# Patient Record
Sex: FEMALE | Race: WHITE
[De-identification: names, ages, dates, MRNs, and addresses within clinical notes are randomized per-mention and may not be internally consistent; named-entity substitution may affect disease eponyms.]

---

## 2018-10-29 ENCOUNTER — HOSPITAL ENCOUNTER (OUTPATIENT)
Dept: HOSPITAL 47 - RADUSWWP | Age: 28
Discharge: HOME | End: 2018-10-29
Attending: OBSTETRICS & GYNECOLOGY
Payer: MEDICAID

## 2018-10-29 DIAGNOSIS — Z30.431: Primary | ICD-10-CM

## 2018-10-29 PROCEDURE — 76830 TRANSVAGINAL US NON-OB: CPT

## 2018-10-29 NOTE — US
EXAMINATION TYPE: US transvaginal

 

DATE OF EXAM: 10/29/2018

 

COMPARISON:

 

CLINICAL HISTORY: CHECK IUD PLACEMENT Z30.431. IUD placement

 

TECHNIQUE:  Transvaginal (TV).  

 

Date of LMP:  10/16/2018

 

EXAM MEASUREMENTS:

 

Uterus:  6.2 x 4.5 x 3.0 cm

Endometrial Stripe: 0.2m

Right Ovary:  2.7 x 1.7 x 1.8

Left Ovary:  3.0 x 1.5 x 1.6

 

 

 

1. Uterus:  Anteverted   wnl

2. Endometrium:  IUD visualized

3. Right Ovary:  wnl

4. Left Ovary:  wnl

5. Bilateral Adnexa:  wnl

6. Posterior cul-de-sac:  no free fluid

Cervix- fluid seen within cervical canal

 

 

 

IMPRESSION:

1. IUD is appropriately placed.

2. Small amount of fluid within the endocervical canal.

## 2021-12-31 ENCOUNTER — HOSPITAL ENCOUNTER (OUTPATIENT)
Dept: HOSPITAL 47 - RADUSWWP | Age: 31
Discharge: HOME | End: 2021-12-31
Attending: OBSTETRICS & GYNECOLOGY
Payer: COMMERCIAL

## 2021-12-31 DIAGNOSIS — N83.201: Primary | ICD-10-CM

## 2021-12-31 PROCEDURE — 76830 TRANSVAGINAL US NON-OB: CPT

## 2021-12-31 NOTE — US
EXAMINATION TYPE: US transvaginal

 

DATE OF EXAM: 12/31/2021

 

COMPARISON: 10/29/2018

 

CLINICAL HISTORY: 31-year-old female R68.89 abnormal pelvic exam, N92.1 metrorrhagia.

 

TECHNIQUE:  Transvaginal (TV).  

Date of LMP:  12-18-21

 

FINDINGS:

 

EXAM MEASUREMENTS:

 

Uterus:  6.2 x 3.2 x 4.9 cm

Endometrial Stripe: 0.2 cm

Right Ovary:  3.5 x 2.2 x 2.2 cm

Left Ovary: 1.7 x 1.1 x 1.1 cm

 

 

 

1. Uterus:  Anteverted, IUD placement appears a properly positioned.. Some fluid is incidentally note
d within the endocervical canal.

2. Endometrium:  wnl

3. Right Ovary:  slightly irregular shaped simple cyst measuring 1.7 x 1.0 x 1.4cm

4. Left Ovary:  wnl

5. Bilateral Adnexa:  wnl

6. Posterior cul-de-sac:  wnl

 

 

 

IMPRESSION: 

1. Some incidental fluid along the endocervical canal.

2. IUD appears appropriately situated within the uterine cavity.

3. A collapsing/recently ruptured cyst or follicle measuring 1.7 cm and the right ovary.